# Patient Record
Sex: FEMALE | Race: WHITE | NOT HISPANIC OR LATINO | Employment: FULL TIME | URBAN - NONMETROPOLITAN AREA
[De-identification: names, ages, dates, MRNs, and addresses within clinical notes are randomized per-mention and may not be internally consistent; named-entity substitution may affect disease eponyms.]

---
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
## 2024-01-26 ENCOUNTER — APPOINTMENT (OUTPATIENT)
Dept: RADIOLOGY | Facility: MEDICAL CENTER | Age: 19
End: 2024-01-26
Attending: EMERGENCY MEDICINE
Payer: COMMERCIAL

## 2024-01-26 ENCOUNTER — HOSPITAL ENCOUNTER (EMERGENCY)
Facility: MEDICAL CENTER | Age: 19
End: 2024-01-26
Attending: EMERGENCY MEDICINE
Payer: COMMERCIAL

## 2024-01-26 VITALS
WEIGHT: 152.56 LBS | SYSTOLIC BLOOD PRESSURE: 112 MMHG | HEIGHT: 64 IN | HEART RATE: 69 BPM | DIASTOLIC BLOOD PRESSURE: 71 MMHG | BODY MASS INDEX: 26.05 KG/M2 | TEMPERATURE: 98.2 F | RESPIRATION RATE: 19 BRPM | OXYGEN SATURATION: 97 %

## 2024-01-26 DIAGNOSIS — Z3A.01 LESS THAN 8 WEEKS GESTATION OF PREGNANCY: ICD-10-CM

## 2024-01-26 DIAGNOSIS — O20.0 THREATENED MISCARRIAGE: Primary | ICD-10-CM

## 2024-01-26 DIAGNOSIS — N39.0 ACUTE UTI: ICD-10-CM

## 2024-01-26 LAB
ALBUMIN SERPL BCP-MCNC: 4.5 G/DL (ref 3.2–4.9)
ALBUMIN/GLOB SERPL: 1.5 G/DL
ALP SERPL-CCNC: 55 U/L (ref 30–99)
ALT SERPL-CCNC: 10 U/L (ref 2–50)
ANION GAP SERPL CALC-SCNC: 13 MMOL/L (ref 7–16)
APPEARANCE UR: CLEAR
AST SERPL-CCNC: 14 U/L (ref 12–45)
B-HCG SERPL-ACNC: ABNORMAL MIU/ML (ref 0–10)
BACTERIA #/AREA URNS HPF: ABNORMAL /HPF
BASOPHILS # BLD AUTO: 0.4 % (ref 0–1.8)
BASOPHILS # BLD: 0.04 K/UL (ref 0–0.12)
BILIRUB SERPL-MCNC: 0.3 MG/DL (ref 0.1–1.5)
BILIRUB UR QL STRIP.AUTO: NEGATIVE
BUN SERPL-MCNC: 6 MG/DL (ref 8–22)
CALCIUM ALBUM COR SERPL-MCNC: 8.5 MG/DL (ref 8.5–10.5)
CALCIUM SERPL-MCNC: 8.9 MG/DL (ref 8.4–10.2)
CHLORIDE SERPL-SCNC: 101 MMOL/L (ref 96–112)
CO2 SERPL-SCNC: 21 MMOL/L (ref 20–33)
COLOR UR: YELLOW
CREAT SERPL-MCNC: 0.51 MG/DL (ref 0.5–1.4)
EOSINOPHIL # BLD AUTO: 0.1 K/UL (ref 0–0.51)
EOSINOPHIL NFR BLD: 1 % (ref 0–6.9)
EPI CELLS #/AREA URNS HPF: ABNORMAL /HPF
ERYTHROCYTE [DISTWIDTH] IN BLOOD BY AUTOMATED COUNT: 35.8 FL (ref 35.9–50)
GFR SERPLBLD CREATININE-BSD FMLA CKD-EPI: 137 ML/MIN/1.73 M 2
GLOBULIN SER CALC-MCNC: 3 G/DL (ref 1.9–3.5)
GLUCOSE SERPL-MCNC: 87 MG/DL (ref 65–99)
GLUCOSE UR STRIP.AUTO-MCNC: NEGATIVE MG/DL
HCT VFR BLD AUTO: 39.2 % (ref 37–47)
HGB BLD-MCNC: 14.3 G/DL (ref 12–16)
IMM GRANULOCYTES # BLD AUTO: 0.03 K/UL (ref 0–0.11)
IMM GRANULOCYTES NFR BLD AUTO: 0.3 % (ref 0–0.9)
KETONES UR STRIP.AUTO-MCNC: NEGATIVE MG/DL
LEUKOCYTE ESTERASE UR QL STRIP.AUTO: ABNORMAL
LIPASE SERPL-CCNC: 32 U/L (ref 11–82)
LYMPHOCYTES # BLD AUTO: 1.99 K/UL (ref 1–4.8)
LYMPHOCYTES NFR BLD: 20.8 % (ref 22–41)
MCH RBC QN AUTO: 31.5 PG (ref 27–33)
MCHC RBC AUTO-ENTMCNC: 36.5 G/DL (ref 32.2–35.5)
MCV RBC AUTO: 86.3 FL (ref 81.4–97.8)
MICRO URNS: ABNORMAL
MONOCYTES # BLD AUTO: 0.57 K/UL (ref 0–0.85)
MONOCYTES NFR BLD AUTO: 6 % (ref 0–13.4)
NEUTROPHILS # BLD AUTO: 6.82 K/UL (ref 1.82–7.42)
NEUTROPHILS NFR BLD: 71.5 % (ref 44–72)
NITRITE UR QL STRIP.AUTO: NEGATIVE
NRBC # BLD AUTO: 0 K/UL
NRBC BLD-RTO: 0 /100 WBC (ref 0–0.2)
NUMBER OF RH DOSES IND 8505RD: NORMAL
PH UR STRIP.AUTO: 7.5 [PH] (ref 5–8)
PLATELET # BLD AUTO: 264 K/UL (ref 164–446)
PMV BLD AUTO: 9.6 FL (ref 9–12.9)
POTASSIUM SERPL-SCNC: 3.7 MMOL/L (ref 3.6–5.5)
PROT SERPL-MCNC: 7.5 G/DL (ref 6–8.2)
PROT UR QL STRIP: NEGATIVE MG/DL
RBC # BLD AUTO: 4.54 M/UL (ref 4.2–5.4)
RBC # URNS HPF: ABNORMAL /HPF
RBC UR QL AUTO: ABNORMAL
RH BLD: NORMAL
SODIUM SERPL-SCNC: 135 MMOL/L (ref 135–145)
SP GR UR STRIP.AUTO: 1.01
UNIDENT CRYS URNS QL MICRO: ABNORMAL /HPF
WBC # BLD AUTO: 9.6 K/UL (ref 4.8–10.8)
WBC #/AREA URNS HPF: ABNORMAL /HPF

## 2024-01-26 PROCEDURE — 84702 CHORIONIC GONADOTROPIN TEST: CPT

## 2024-01-26 PROCEDURE — 36415 COLL VENOUS BLD VENIPUNCTURE: CPT

## 2024-01-26 PROCEDURE — 99284 EMERGENCY DEPT VISIT MOD MDM: CPT

## 2024-01-26 PROCEDURE — 86901 BLOOD TYPING SEROLOGIC RH(D): CPT

## 2024-01-26 PROCEDURE — 83690 ASSAY OF LIPASE: CPT

## 2024-01-26 PROCEDURE — 80053 COMPREHEN METABOLIC PANEL: CPT

## 2024-01-26 PROCEDURE — 76817 TRANSVAGINAL US OBSTETRIC: CPT

## 2024-01-26 PROCEDURE — 85025 COMPLETE CBC W/AUTO DIFF WBC: CPT

## 2024-01-26 PROCEDURE — 81001 URINALYSIS AUTO W/SCOPE: CPT

## 2024-01-26 RX ORDER — NITROFURANTOIN 25; 75 MG/1; MG/1
100 CAPSULE ORAL 2 TIMES DAILY
Qty: 10 CAPSULE | Refills: 0 | Status: ACTIVE | OUTPATIENT
Start: 2024-01-26 | End: 2024-01-31

## 2024-01-26 ASSESSMENT — PAIN DESCRIPTION - PAIN TYPE: TYPE: ACUTE PAIN

## 2024-01-26 ASSESSMENT — FIBROSIS 4 INDEX: FIB4 SCORE: 0.31

## 2024-01-27 NOTE — ED PROVIDER NOTES
ED Provider Note    Scribed for Nash Franklin by Nash Franklin. 1/26/2024  6:04 PM    Primary care provider: KIRA Kelley  Means of arrival: private vehicle   History obtained from: Patient  History limited by: None    CHIEF COMPLAINT  Chief Complaint   Patient presents with    Vaginal Bleeding     Pt is 8 week pregnant - first appointment is on 1/29th.   N/V today with little cramps in her uterus. Vaginal bleeding is light pink bleeding.   First pregnancy.      EXTERNAL RECORDS REVIEWED  Outpatient Notes patient was seen on the second of this month at an outpatient facility for flulike symptoms and diagnosed with laryngitis    HPI/ROS  LIMITATION TO HISTORY   Select: : None  OUTSIDE HISTORIAN(S):  Grandfather at bedside helps provide collateral formation regarding patient's presentation here gab    [facility]  Marianne Gallardo is a 18 y.o. female who presents to the Emergency Department with no past medical history, takes no medications, presenting for suspected pregnancy.  Patient has had 5 pregnancy test are positive at home.  Last menstrual cycle was 2 months ago.  She has had persistent typical symptoms of pregnancy including morning sickness with vomiting.  Today she noticed that she was having some very scant pink vaginal spotting, some abdominal cramps, and her vomiting was not as bad as it usually is so she was concerned about a miscarriage.  She would like to continue the pregnancy.  She is not taking birth control.  She arrives here with normal vital signs and has a very benign physical exam, no abdominal tenderness.  She has no active vaginal bleeding.  She is taking her prenatal vitamins.  Denies alcohol drug or tobacco use.  Has OB/GYN follow-up in about a week for her first visit.    REVIEW OF SYSTEMS  As above, all other systems reviewed and are negative.   See HPI for further details.     PAST MEDICAL HISTORY   has a past medical history of Allergy, Anxiety, Asthma, Depression,  "and OCD (obsessive compulsive disorder).  SURGICAL HISTORY  patient denies any surgical history  SOCIAL HISTORY  Social History     Tobacco Use    Smoking status: Never    Smokeless tobacco: Never   Vaping Use    Vaping Use: Never used   Substance Use Topics    Alcohol use: Never    Drug use: No      Social History     Substance and Sexual Activity   Drug Use No     FAMILY HISTORY  Family History   Problem Relation Age of Onset    Asthma Mother     Psychiatric Illness Mother     Psychiatric Illness Father     No Known Problems Sister     Alcohol abuse Paternal Aunt     Alcohol abuse Paternal Uncle     Alcohol abuse Maternal Grandmother     Psychiatric Illness Maternal Grandmother     Heart Disease Maternal Grandfather     Alcohol abuse Maternal Grandfather      CURRENT MEDICATIONS  Home Medications    **Home medications have not yet been reviewed for this encounter**       ALLERGIES  Allergies   Allergen Reactions    Sulfa Drugs Hives       PHYSICAL EXAM    VITAL SIGNS:   Vitals:    01/26/24 1622   BP: (!) 145/78   Pulse: 82   Resp: 18   Temp: 36.5 °C (97.7 °F)   TempSrc: Temporal   SpO2: 99%   Weight: 69.2 kg (152 lb 8.9 oz)   Height: 1.626 m (5' 4\")     Vitals: My interpretation: normotensive, not tachycardic, afebrile, not hypoxic    Reinterpretation of vitals: Unchanged unremarkable    Cardiac Monitor Interpretation: The cardiac monitor revealed normal Sinus Rhythm as interpreted by me. The cardiac monitor was ordered secondary to the patient's history of possible miscarriage and to monitor for dysrhythmia and/or tachycardia.    PE:   Gen: sitting comfortably, speaking clearly, appears in no acute distress   ENT: Mucous membranes moist, posterior pharynx clear, uvula midline, nares patent bilaterally   Neck: Supple, FROM  Pulmonary: Lungs are clear to auscultation bilaterally. No tachypnea  CV:  RRR, no murmur appreciated, pulses 2+ in both upper and lower extremities  Abdomen: soft, NT/ND; no " rebound/guarding  : no CVA or suprapubic tenderness   Neuro: A&Ox4 (person, place, time, situation), speech fluent, gait steady, no focal deficits appreciated  Skin: No rash or lesions.  No pallor or jaundice.  No cyanosis.  Warm and dry.     DIAGNOSTIC STUDIES / PROCEDURES    LABS  Results for orders placed or performed during the hospital encounter of 01/26/24   CBC WITH DIFFERENTIAL   Result Value Ref Range    WBC 9.6 4.8 - 10.8 K/uL    RBC 4.54 4.20 - 5.40 M/uL    Hemoglobin 14.3 12.0 - 16.0 g/dL    Hematocrit 39.2 37.0 - 47.0 %    MCV 86.3 81.4 - 97.8 fL    MCH 31.5 27.0 - 33.0 pg    MCHC 36.5 (H) 32.2 - 35.5 g/dL    RDW 35.8 (L) 35.9 - 50.0 fL    Platelet Count 264 164 - 446 K/uL    MPV 9.6 9.0 - 12.9 fL    Neutrophils-Polys 71.50 44.00 - 72.00 %    Lymphocytes 20.80 (L) 22.00 - 41.00 %    Monocytes 6.00 0.00 - 13.40 %    Eosinophils 1.00 0.00 - 6.90 %    Basophils 0.40 0.00 - 1.80 %    Immature Granulocytes 0.30 0.00 - 0.90 %    Nucleated RBC 0.00 0.00 - 0.20 /100 WBC    Neutrophils (Absolute) 6.82 1.82 - 7.42 K/uL    Lymphs (Absolute) 1.99 1.00 - 4.80 K/uL    Monos (Absolute) 0.57 0.00 - 0.85 K/uL    Eos (Absolute) 0.10 0.00 - 0.51 K/uL    Baso (Absolute) 0.04 0.00 - 0.12 K/uL    Immature Granulocytes (abs) 0.03 0.00 - 0.11 K/uL    NRBC (Absolute) 0.00 K/uL   COMP METABOLIC PANEL   Result Value Ref Range    Sodium 135 135 - 145 mmol/L    Potassium 3.7 3.6 - 5.5 mmol/L    Chloride 101 96 - 112 mmol/L    Co2 21 20 - 33 mmol/L    Anion Gap 13.0 7.0 - 16.0    Glucose 87 65 - 99 mg/dL    Bun 6 (L) 8 - 22 mg/dL    Creatinine 0.51 0.50 - 1.40 mg/dL    Calcium 8.9 8.4 - 10.2 mg/dL    Correct Calcium 8.5 8.5 - 10.5 mg/dL    AST(SGOT) 14 12 - 45 U/L    ALT(SGPT) 10 2 - 50 U/L    Alkaline Phosphatase 55 30 - 99 U/L    Total Bilirubin 0.3 0.1 - 1.5 mg/dL    Albumin 4.5 3.2 - 4.9 g/dL    Total Protein 7.5 6.0 - 8.2 g/dL    Globulin 3.0 1.9 - 3.5 g/dL    A-G Ratio 1.5 g/dL   LIPASE   Result Value Ref Range    Lipase 32  11 - 82 U/L   URINALYSIS,CULTURE IF INDICATED    Specimen: Urine   Result Value Ref Range    Color Yellow     Character Clear     Specific Gravity 1.010 <1.035    Ph 7.5 5.0 - 8.0    Glucose Negative Negative mg/dL    Ketones Negative Negative mg/dL    Protein Negative Negative mg/dL    Bilirubin Negative Negative    Nitrite Negative Negative    Leukocyte Esterase Small (A) Negative    Occult Blood Trace (A) Negative    Micro Urine Req Microscopic    RH Type for Rhogam from E.D.   Result Value Ref Range    Emergency Department Rh Typing POS     Number Of Rh Doses Indicated ZERO    URINE MICROSCOPIC (W/UA)   Result Value Ref Range    WBC 2-5 /hpf    RBC 0-2 /hpf    Bacteria Few (A) None /hpf    Epithelial Cells Few Few /hpf    Urine Crystals Few Amorphous /hpf   ESTIMATED GFR   Result Value Ref Range    GFR (CKD-EPI) 137 >60 mL/min/1.73 m 2      All labs reviewed by me. Labs were compared to prior labs if they were available. Significant for no leukocytosis, no anemia, normal electrolytes, normal renal function, normal liver enzymes, normal bilirubin, normal lipase, urinalysis questionably positive for UTI, pregnancy test pending, Rh+ no indication for RhoGAM.    RADIOLOGY  I have independently interpreted the diagnostic imaging associated with this visit and am waiting the final reading from the radiologist.   My preliminary interpretation is a follows: None  Radiologist interpretation is as follows:  US-OB TRANSVAGINAL ONLY   Final Result      Single living intrauterine pregnancy at 7 weeks 6 days  gestation by ultrasound.        COURSE & MEDICAL DECISION MAKING  Nursing notes, VS, PMSFHx, labs, imaging, EKG reviewed in chart.    ED Observation Status? No; Patient does not meet criteria for ED Observation.     Ddx: Threatened miscarriage, completed miscarriage, vaginal bleeding in pregnancy, subchorionic hemorrhage    MDM: 6:04 PM Marianne Gallardo is a 18 y.o. female who presented with evaluation of possible  "threatened miscarriage.  Patient is healthy, takes no medications.  Not on birth control.  Last menstrual cycle was 2 months ago.  Has had morning sickness for the past few weeks.  She has had 5 home pregnancy test.  Has her first OB/GYN visit coming up soon for ultrasound and evaluation.  Today her morning sickness was not as severe as it usually is, she noticed some slight pink spotting, and felt some slight abdominal cramping.  She states \"I just started googling things all day and got very anxious and wanted to come in\".  Arrival here vital signs are normal.  She has a benign abdomen.  She is in no acute distress.  She will undergo workup for possible threatened miscarriage.  Ultrasound done and shows single living intrauterine pregnancy at 7 weeks 6 days gestation by ultrasound and no complications are present.  Will undergo laboratory evaluation including beta hCG.  All labs reviewed by me. Labs were compared to prior labs if they were available. Significant for no leukocytosis, no anemia, normal electrolytes, normal renal function, normal liver enzymes, normal bilirubin, normal lipase, urinalysis questionably positive for UTI, pregnancy test pending, Rh+ no indication for RhoGAM.  Overall patient is benign workup here in the ED.  Will start patient on antibiotics with Macrobid.  Have her follow-up with her OB/GYN appointment for further evaluation.  Discussed all these findings with her and her grandfather at bedside they verbalized understanding and are amenable.    ADDITIONAL PROBLEM LIST AND DISPOSITION    I have discussed management of the patient with the following physicians and GEOVANY's:  None    Discussion of management with other [facility] or appropriate source(s): None     Escalation of care considered, and ultimately not performed:acute inpatient care management, however at this time, the patient is most appropriate for outpatient management    Barriers to care at this time, including but not limited to:  " None .     Decision tools and prescription drugs considered including, but not limited to:  None .    FINAL IMPRESSION  1. Threatened miscarriage Acute   2. Less than 8 weeks gestation of pregnancy Acute      The note accurately reflects work and decisions made by me.  Nash Franklin  1/26/2024  6:04 PM

## 2024-01-27 NOTE — DISCHARGE INSTRUCTIONS
Thankfully your ultrasound is very reassuring.  Please follow-up with your OB/GYN appointment.  I sent a prescription for Macrobid which is an antibiotic to treat your urinary tract infection.  Take it as directed for the next 5 days.  Follow-up as an outpatient.  If you have any worse symptoms or concerns please return to the ED.  Thank you for coming in today.

## 2024-01-27 NOTE — ED TRIAGE NOTES
Chief Complaint   Patient presents with    Vaginal Bleeding     Pt is 8 week pregnant - first appointment is on 1/29th.   N/V today with little cramps in her uterus. Vaginal bleeding is light pink bleeding.   First pregnancy.

## 2024-01-27 NOTE — ED NOTES
Pt. Verbalizes understanding of discharge instructions. accompanied to lobby with parent. Pt. Alert/awake in NAD.  All question answered and understood. Advised to ff-up with PCP.

## 2024-01-27 NOTE — ED NOTES
Pt u/s completed  1830 Iv placed , labs drawn and taken to lab. poc update given to pt, results pending at this time

## 2024-01-29 PROBLEM — Z34.01 ENCOUNTER FOR SUPERVISION OF NORMAL FIRST PREGNANCY IN FIRST TRIMESTER: Status: ACTIVE | Noted: 2024-01-29

## 2024-03-31 PROBLEM — O09.899 RUBELLA NON-IMMUNE STATUS, ANTEPARTUM: Status: ACTIVE | Noted: 2024-03-31

## 2024-03-31 PROBLEM — O09.899 SUSCEPTIBLE TO VARICELLA (NON-IMMUNE), CURRENTLY PREGNANT: Status: ACTIVE | Noted: 2024-03-31

## 2024-03-31 PROBLEM — Z28.39 RUBELLA NON-IMMUNE STATUS, ANTEPARTUM: Status: ACTIVE | Noted: 2024-03-31

## 2024-03-31 PROBLEM — Z28.39 SUSCEPTIBLE TO VARICELLA (NON-IMMUNE), CURRENTLY PREGNANT: Status: ACTIVE | Noted: 2024-03-31

## 2024-06-14 ENCOUNTER — HOSPITAL ENCOUNTER (EMERGENCY)
Facility: MEDICAL CENTER | Age: 19
End: 2024-06-14
Attending: OBSTETRICS & GYNECOLOGY | Admitting: OBSTETRICS & GYNECOLOGY
Payer: COMMERCIAL

## 2024-06-14 VITALS
HEIGHT: 64 IN | RESPIRATION RATE: 16 BRPM | HEART RATE: 78 BPM | DIASTOLIC BLOOD PRESSURE: 60 MMHG | BODY MASS INDEX: 28.17 KG/M2 | SYSTOLIC BLOOD PRESSURE: 115 MMHG | WEIGHT: 165 LBS | TEMPERATURE: 97 F

## 2024-06-14 LAB — A1 MICROGLOB PLACENTAL VAG QL: NEGATIVE

## 2024-06-14 PROCEDURE — 99284 EMERGENCY DEPT VISIT MOD MDM: CPT

## 2024-06-14 PROCEDURE — 59025 FETAL NON-STRESS TEST: CPT | Mod: 26 | Performed by: OBSTETRICS & GYNECOLOGY

## 2024-06-14 PROCEDURE — 84112 EVAL AMNIOTIC FLUID PROTEIN: CPT

## 2024-06-14 PROCEDURE — 59025 FETAL NON-STRESS TEST: CPT

## 2024-06-14 PROCEDURE — 99282 EMERGENCY DEPT VISIT SF MDM: CPT | Mod: 25,GC | Performed by: OBSTETRICS & GYNECOLOGY

## 2024-06-14 ASSESSMENT — FIBROSIS 4 INDEX: FIB4 SCORE: 0.48

## 2024-06-14 NOTE — ED PROVIDER NOTES
LABOR & DELIVERY TRIAGE HISTORY AND PHYSICAL  Patient Name: Marianne Gallardo Age/Sex: 19 y.o. female  : 2005 MRN: [id number]      HISTORY OF PRESENT ILLNESS:   Marianne Gallardo is a 19 y.o.  at 27w6d weeks gestation by LMP of 23 who is here for leakage of fluid. Patient reports a sensation of dripping fluid. States it did not smell like urine.     Patient denies sexual intercourse in the last several days.  Hydration: adequate     negative for contractions  negative pain   positive for LOF, dripping   negative for vaginal bleeding  positive for fetal movement    Her EDC is 2024, by Last Menstrual Period.   She has received prenatal care with [facility].  Complications during pregnancy: Anxiety and depression, Varicella non-immune and rubella non immurne     History of STD: denies      OBSTETRICAL HISTORY:    OB History    Para Term  AB Living   1 0 0 0 0 0   SAB IAB Ectopic Molar Multiple Live Births   0 0 0 0 0 0      # Outcome Date GA Lbr Payam/2nd Weight Sex Delivery Anes PTL Lv   1 Current                MEDICAL HISTORY:    Past Medical History:   Diagnosis Date    Allergy     Anxiety     Asthma     Depression     OCD (obsessive compulsive disorder)        SURGICAL HISTORY:  No past surgical history on file.    MEDICATIONS:    Medications Prior to Admission   Medication Sig Dispense Refill Last Dose    Prenatal Multivit-Min-Fe-FA (PRENATAL 1 + IRON PO) Take  by mouth.          ALLERGIES:    Allergies   Allergen Reactions    Sulfa Drugs Hives        SOCIAL HISTORY:    reports that she has never smoked. She has never used smokeless tobacco. She reports that she does not drink alcohol and does not use drugs.    FAMILY HISTORY:  Family History   Problem Relation Age of Onset    Asthma Mother     Psychiatric Illness Mother     Psychiatric Illness Father     No Known Problems Sister     Alcohol abuse Paternal Aunt     Alcohol abuse Paternal Uncle     Alcohol abuse Maternal Grandmother      "Psychiatric Illness Maternal Grandmother     Heart Disease Maternal Grandfather     Alcohol abuse Maternal Grandfather     Diabetes Paternal Grandmother        REVIEW OF SYSTEMS:  Pertinent items are noted in HPI.      PHYSICAL EXAM:    Vitals:    24 1434   BP: 115/60   Pulse: 78   Resp: 16   Temp: 36.1 °C (97 °F)   TempSrc: Temporal   Weight: 74.8 kg (165 lb)   Height: 1.626 m (5' 4\")     Temp (24hrs), Av.1 °C (97 °F), Min:36.1 °C (97 °F), Max:36.1 °C (97 °F)    General: No acute distress, resting comfortably in bed.  HEENT: normocephalic, nontraumatic, PERRLA, EOMI  Cardiovascular: Heart RRR with no murmurs, rubs or gallops. Distal Pulses 2+  Respiratory: symmetric chest expansion, lungs CTAB, with no wheezes, rales, rhonci  Abdomen: gravid, nontender  Musculoskeletal: REYES spontaneously  Neuro: non focal with no numbness, tingling or changes in sensation    Cervix:  Deferred   [place]: No contractions   FHRM: Baseline 148, mod variability, + accels, no decels  Speculum exam: Per RN no pooling of fluid and unable to visualize cervix due to intolerance of exam       Prenatal Labs:  HepBSAg NR  Hep C NR   HIV NR   RPR NR  Rubella indeterminate   ABO O+    Group B Strep: not yet completed due to gestation       ASSESSMENT/ PLAN:   Marianne Gallardo is a 19 y.o.  at 27w6d gestation by LMP here for rule out rupture of membranes. No pooling of fluid on speculum exam.     - Amnisure: negative   - Patient is cleared to return home with family. Encouraged to see MD/DO for increased painful uterine contractions @ 3-5, vaginal bleeding, loss of fluid, or other serious symptoms.      Discussed case with Dr. Tamayo, [facility] Attending. Case was discussed and attending agreed with plan prior to discharge of patient.    No follow-up provider specified.     Future Appointments   Date Time Provider Department Center   2024  1:00 PM Emelia Centeno [facility] None           Sulma Rico MD   PGY-1 Resident   [facility] " Family Medicine

## 2024-06-14 NOTE — PROGRESS NOTES
EDC -  EGA - 27.6    1408 - Pt arrived to labor and delivery for complaints of LOF. Pt placed in room LDA1.    1429 - External monitors in place X2. Category I FHT at this time. VSS. Pt reports good FM. No complaints of contractions or vaginal bleeding. States small amount of LOF the last few days. POC discussed with pt and family members, all questions answered.      1500 Report given Dr. Rico. Orders received.      1535 Report given to MANAV Britton. POC discussed, questions answered.

## 2024-06-14 NOTE — PROGRESS NOTES
1535 Report received from Cinthya STUBBS RN at bedside and assumed care. POC discussed with pt and s/o and encouraged to state needs or questions at any time.    1606 Dr. Rico at bedside, POC discussed with pt. D/C order received.    1616 L&D D/C instructions reviewed with pt and s/o who state understanding. Pt d/c to self with s/o.

## 2024-07-01 ENCOUNTER — HOSPITAL ENCOUNTER (EMERGENCY)
Facility: MEDICAL CENTER | Age: 19
End: 2024-07-01
Attending: OBSTETRICS & GYNECOLOGY | Admitting: OBSTETRICS & GYNECOLOGY
Payer: COMMERCIAL

## 2024-07-01 VITALS
WEIGHT: 168 LBS | BODY MASS INDEX: 28.68 KG/M2 | TEMPERATURE: 97.5 F | HEART RATE: 72 BPM | DIASTOLIC BLOOD PRESSURE: 77 MMHG | SYSTOLIC BLOOD PRESSURE: 118 MMHG | HEIGHT: 64 IN | OXYGEN SATURATION: 100 %

## 2024-07-01 LAB
ERYTHROCYTE [DISTWIDTH] IN BLOOD BY AUTOMATED COUNT: 40.2 FL (ref 35.9–50)
HCT VFR BLD AUTO: 35.2 % (ref 37–47)
HGB BLD-MCNC: 12.3 G/DL (ref 12–16)
MCH RBC QN AUTO: 31.9 PG (ref 27–33)
MCHC RBC AUTO-ENTMCNC: 34.9 G/DL (ref 32.2–35.5)
MCV RBC AUTO: 91.2 FL (ref 81.4–97.8)
PLATELET # BLD AUTO: 228 K/UL (ref 164–446)
PMV BLD AUTO: 10.2 FL (ref 9–12.9)
RBC # BLD AUTO: 3.86 M/UL (ref 4.2–5.4)
WBC # BLD AUTO: 9.1 K/UL (ref 4.8–10.8)

## 2024-07-01 PROCEDURE — 99282 EMERGENCY DEPT VISIT SF MDM: CPT | Performed by: STUDENT IN AN ORGANIZED HEALTH CARE EDUCATION/TRAINING PROGRAM

## 2024-07-01 PROCEDURE — 99283 EMERGENCY DEPT VISIT LOW MDM: CPT

## 2024-07-01 PROCEDURE — 59025 FETAL NON-STRESS TEST: CPT

## 2024-07-01 PROCEDURE — 36415 COLL VENOUS BLD VENIPUNCTURE: CPT

## 2024-07-01 PROCEDURE — 85027 COMPLETE CBC AUTOMATED: CPT

## 2024-07-01 ASSESSMENT — FIBROSIS 4 INDEX: FIB4 SCORE: 0.48

## 2024-07-01 ASSESSMENT — PAIN SCALES - GENERAL: PAINLEVEL: 0 - NO PAIN

## 2024-07-06 ENCOUNTER — HOSPITAL ENCOUNTER (EMERGENCY)
Facility: MEDICAL CENTER | Age: 19
End: 2024-07-06
Attending: OBSTETRICS & GYNECOLOGY | Admitting: OBSTETRICS & GYNECOLOGY
Payer: COMMERCIAL

## 2024-07-06 VITALS
DIASTOLIC BLOOD PRESSURE: 71 MMHG | HEIGHT: 64 IN | BODY MASS INDEX: 28.68 KG/M2 | WEIGHT: 168 LBS | OXYGEN SATURATION: 97 % | SYSTOLIC BLOOD PRESSURE: 115 MMHG | RESPIRATION RATE: 16 BRPM | TEMPERATURE: 98.1 F | HEART RATE: 81 BPM

## 2024-07-06 PROCEDURE — 99283 EMERGENCY DEPT VISIT LOW MDM: CPT | Mod: GC,25 | Performed by: OBSTETRICS & GYNECOLOGY

## 2024-07-06 PROCEDURE — 99284 EMERGENCY DEPT VISIT MOD MDM: CPT

## 2024-07-06 PROCEDURE — 59025 FETAL NON-STRESS TEST: CPT | Mod: 26 | Performed by: OBSTETRICS & GYNECOLOGY

## 2024-07-06 PROCEDURE — 59025 FETAL NON-STRESS TEST: CPT

## 2024-07-06 ASSESSMENT — FIBROSIS 4 INDEX: FIB4 SCORE: 0.46

## 2024-07-06 ASSESSMENT — PAIN SCALES - GENERAL: PAINLEVEL: 0 - NO PAIN
